# Patient Record
Sex: MALE | Race: WHITE
[De-identification: names, ages, dates, MRNs, and addresses within clinical notes are randomized per-mention and may not be internally consistent; named-entity substitution may affect disease eponyms.]

---

## 2018-09-21 ENCOUNTER — HOSPITAL ENCOUNTER (EMERGENCY)
Dept: HOSPITAL 76 - ED | Age: 18
Discharge: HOME | End: 2018-09-21
Payer: COMMERCIAL

## 2018-09-21 VITALS — SYSTOLIC BLOOD PRESSURE: 136 MMHG | DIASTOLIC BLOOD PRESSURE: 80 MMHG

## 2018-09-21 DIAGNOSIS — F17.200: ICD-10-CM
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
## 2018-09-21 DIAGNOSIS — S42.414A: Primary | ICD-10-CM
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
## 2018-09-21 PROCEDURE — 99283 EMERGENCY DEPT VISIT LOW MDM: CPT

## 2018-09-21 PROCEDURE — 29105 APPLICATION LONG ARM SPLINT: CPT

## 2018-09-21 NOTE — XRAY REPORT
Reason:  fall/pain

Procedure Date:  09/21/2018   

Accession Number:  [id number] / [id number]                    

Procedure:  XR  - Elbow 3 View RT CPT Code:  

 

FULL RESULT:

 

 

EXAM:

RIGHT ELBOW RADIOGRAPHY

 

EXAM DATE: 9/21/2018 07:59 PM.

 

CLINICAL HISTORY: Fall, pain.

 

COMPARISON: None.

 

TECHNIQUE: 4 views.

 

FINDINGS:

Bones: Acute asymmetrically displaced supracondylar fracture, degree of 

displacement greatest involving the trochlea.

 

Joints: Large effusion.

 

Soft Tissues: Extensive edema.

IMPRESSION: Supracondylar fracture.

 

RADIA

## 2018-09-21 NOTE — ED PHYSICIAN DOCUMENTATION
PD HPI UPPER EXT INJURY





- Stated complaint


Stated Complaint: RT ARM INJ





- Chief complaint


Chief Complaint: Ext Problem





- History obtained from


History obtained from: Patient





- History of Present Illness


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  He fell while

working out and hyperextended his elbow.  Isolated elbow injury.  He declines 

pain medication on initial evaluation.)





Review of Systems


Constitutional: denies: Fever, Chills


Musculoskeletal: reports: Joint swelling


Neurologic: denies: Headache, Head injury





PD PAST MEDICAL HISTORY





- Past Medical History


Past Medical History: No





- Past Surgical History


Past Surgical History: No





- Present Medications


Home Medications: 


                                Ambulatory Orders











 Medication  Instructions  Recorded  Confirmed


 


No Known Home Medications  09/21/18 09/21/18














- Allergies


Allergies/Adverse Reactions: 


                                    Allergies











Allergy/AdvReac Type Severity Reaction Status Date / Time


 


No Known Drug Allergies Allergy   Verified 09/21/18 19:34














- Social History


Does the pt smoke?: Yes


Smoking Status: Current every day smoker


Does the pt drink ETOH?: No


Does the pt have substance abuse?: No





- Immunizations


Immunizations are current?: Yes





- POLST


Patient has POLST: No





PD ED PE NORMAL





- Vitals


Vital signs reviewed: Yes





- General


General: Alert and oriented X 3, No acute distress





- Neck


Neck: Supple, no meningeal sign, No bony TTP





- Extremities


Extremities: Other (Significant swelling and tenderness in the supracondylar 

area of the right elbow.  He cannot range it at all.  Normal sensation in the 

hand and radial pulse.)





- Neuro


Neuro: Alert and oriented X 3, Normal speech





Results





- Vitals


Vitals: 


                               Vital Signs - 24 hr











  09/21/18 09/21/18





  19:31 20:44


 


Temperature 37.3 C 37.0 C


 


Heart Rate 65 82


 


Respiratory 18 20





Rate  


 


Blood Pressure 139/76 H 136/80 H


 


O2 Saturation 96 97








                                     Oxygen











O2 Source                      Room air

















- Rads (name of study)


  ** R elbow XR


Radiology: EMP read contemporaneously (Acute and asymmetrically displaced 

supracondylar fracture with a large effusion.)





Procedures





- Splint (location)


  ** RUE


Splint applied by: Tech


Type of splint: Fiberglass, Long arm, Posterior


Other: Patient tolerated well, No complications, Neurovascular intact, Sling 

provided





PD MEDICAL DECISION MAKING





- ED course


ED course: 





[place] D[place] 

images and recommended splinting and will follow up with him as an outpatient.





- Sepsis Event


Vital Signs: 


                               Vital Signs - 24 hr











  09/21/18 09/21/18





  19:31 20:44


 


Temperature 37.3 C 37.0 C


 


Heart Rate 65 82


 


Respiratory 18 20





Rate  


 


Blood Pressure 139/76 H 136/80 H


 


O2 Saturation 96 97








                                     Oxygen











O2 Source                      Room air

















Departure





- Departure


Disposition: 01 Home, Self Care


Clinical Impression: 


 Supracondylar fracture of humerus





Condition: Good


Record reviewed to determine appropriate education?: Yes


Instructions:  ED Fx Upper Ext


Comments: 


Keep the splint on and dry, Tylenol as needed for pain.  Report to the 

orthopedics desk at the [facility] on Monday at 7:45 AM to see Dr. Michel 

for further evaluation and treatment.


Discharge Date/Time: 09/21/18 20:45